# Patient Record
Sex: FEMALE | Race: BLACK OR AFRICAN AMERICAN | NOT HISPANIC OR LATINO
[De-identification: names, ages, dates, MRNs, and addresses within clinical notes are randomized per-mention and may not be internally consistent; named-entity substitution may affect disease eponyms.]

---

## 2018-04-23 PROBLEM — Z00.00 ENCOUNTER FOR PREVENTIVE HEALTH EXAMINATION: Status: ACTIVE | Noted: 2018-04-23

## 2018-05-08 ENCOUNTER — APPOINTMENT (OUTPATIENT)
Dept: PULMONOLOGY | Facility: CLINIC | Age: 68
End: 2018-05-08
Payer: MEDICARE

## 2018-05-08 VITALS
TEMPERATURE: 98.1 F | DIASTOLIC BLOOD PRESSURE: 58 MMHG | HEART RATE: 75 BPM | SYSTOLIC BLOOD PRESSURE: 108 MMHG | BODY MASS INDEX: 23.56 KG/M2 | OXYGEN SATURATION: 97 % | WEIGHT: 120 LBS | HEIGHT: 60 IN

## 2018-05-08 DIAGNOSIS — F17.200 NICOTINE DEPENDENCE, UNSPECIFIED, UNCOMPLICATED: ICD-10-CM

## 2018-05-08 DIAGNOSIS — Z87.09 PERSONAL HISTORY OF OTHER DISEASES OF THE RESPIRATORY SYSTEM: ICD-10-CM

## 2018-05-08 DIAGNOSIS — I25.2 OLD MYOCARDIAL INFARCTION: ICD-10-CM

## 2018-05-08 DIAGNOSIS — Z86.2 PERSONAL HISTORY OF DISEASES OF THE BLOOD AND BLOOD-FORMING ORGANS AND CERTAIN DISORDERS INVOLVING THE IMMUNE MECHANISM: ICD-10-CM

## 2018-05-08 PROCEDURE — 99215 OFFICE O/P EST HI 40 MIN: CPT | Mod: 25

## 2018-05-08 PROCEDURE — 94060 EVALUATION OF WHEEZING: CPT

## 2018-05-08 PROCEDURE — 94727 GAS DIL/WSHOT DETER LNG VOL: CPT

## 2018-05-08 PROCEDURE — 99407 BEHAV CHNG SMOKING > 10 MIN: CPT

## 2018-05-08 PROCEDURE — 94729 DIFFUSING CAPACITY: CPT

## 2018-05-08 RX ORDER — TRAMADOL HYDROCHLORIDE 50 MG/1
50 TABLET, COATED ORAL
Refills: 0 | Status: ACTIVE | COMMUNITY
Start: 2018-05-08

## 2018-05-08 RX ORDER — SIMVASTATIN 20 MG/1
20 TABLET, FILM COATED ORAL
Refills: 0 | Status: ACTIVE | COMMUNITY
Start: 2018-05-08

## 2018-05-08 RX ORDER — ASPIRIN 81 MG/1
81 TABLET, CHEWABLE ORAL
Refills: 0 | Status: ACTIVE | COMMUNITY
Start: 2018-05-08

## 2018-05-08 RX ORDER — TIOTROPIUM BROMIDE 18 UG/1
18 CAPSULE ORAL; RESPIRATORY (INHALATION)
Refills: 0 | Status: ACTIVE | COMMUNITY
Start: 2018-05-08

## 2018-05-08 RX ORDER — DEFERASIROX 90 MG/1
90 TABLET, FILM COATED ORAL
Refills: 0 | Status: ACTIVE | COMMUNITY
Start: 2018-05-08

## 2018-05-08 RX ORDER — LOSARTAN POTASSIUM 50 MG/1
50 TABLET, FILM COATED ORAL
Refills: 0 | Status: ACTIVE | COMMUNITY
Start: 2018-05-08

## 2018-05-08 RX ORDER — LENALIDOMIDE 10 MG/1
10 CAPSULE ORAL
Refills: 0 | Status: ACTIVE | COMMUNITY
Start: 2018-05-08

## 2018-05-08 RX ORDER — FILGRASTIM 300 UG/ML
300 INJECTION, SOLUTION INTRAVENOUS; SUBCUTANEOUS
Refills: 0 | Status: ACTIVE | COMMUNITY
Start: 2018-05-08

## 2018-05-08 RX ORDER — ERYTHROPOIETIN 40000 [IU]/ML
40000 INJECTION, SOLUTION INTRAVENOUS; SUBCUTANEOUS
Refills: 0 | Status: ACTIVE | COMMUNITY
Start: 2018-05-08

## 2018-05-08 RX ORDER — ALBUTEROL SULFATE 90 UG/1
108 (90 BASE) AEROSOL, METERED RESPIRATORY (INHALATION)
Refills: 0 | Status: ACTIVE | COMMUNITY
Start: 2018-05-08

## 2018-05-08 RX ORDER — METOPROLOL SUCCINATE 25 MG/1
25 TABLET, EXTENDED RELEASE ORAL
Refills: 0 | Status: ACTIVE | COMMUNITY
Start: 2018-05-08

## 2018-07-20 ENCOUNTER — APPOINTMENT (OUTPATIENT)
Dept: PULMONOLOGY | Facility: CLINIC | Age: 68
End: 2018-07-20
Payer: MEDICARE

## 2018-07-20 VITALS
SYSTOLIC BLOOD PRESSURE: 108 MMHG | BODY MASS INDEX: 22.46 KG/M2 | OXYGEN SATURATION: 94 % | DIASTOLIC BLOOD PRESSURE: 80 MMHG | HEART RATE: 94 BPM | WEIGHT: 115 LBS

## 2018-07-20 PROCEDURE — 99214 OFFICE O/P EST MOD 30 MIN: CPT

## 2018-10-19 ENCOUNTER — APPOINTMENT (OUTPATIENT)
Dept: PULMONOLOGY | Facility: CLINIC | Age: 68
End: 2018-10-19
Payer: MEDICARE

## 2018-10-19 VITALS
WEIGHT: 116 LBS | OXYGEN SATURATION: 96 % | SYSTOLIC BLOOD PRESSURE: 100 MMHG | BODY MASS INDEX: 22.66 KG/M2 | HEART RATE: 102 BPM | DIASTOLIC BLOOD PRESSURE: 50 MMHG

## 2018-10-19 PROCEDURE — 99214 OFFICE O/P EST MOD 30 MIN: CPT | Mod: 25

## 2018-10-19 PROCEDURE — 99407 BEHAV CHNG SMOKING > 10 MIN: CPT

## 2018-11-27 ENCOUNTER — APPOINTMENT (OUTPATIENT)
Dept: PULMONOLOGY | Facility: CLINIC | Age: 68
End: 2018-11-27
Payer: MEDICARE

## 2018-11-27 VITALS
OXYGEN SATURATION: 88 % | DIASTOLIC BLOOD PRESSURE: 58 MMHG | SYSTOLIC BLOOD PRESSURE: 92 MMHG | BODY MASS INDEX: 22.07 KG/M2 | HEART RATE: 98 BPM | WEIGHT: 113 LBS

## 2018-11-27 PROCEDURE — 99407 BEHAV CHNG SMOKING > 10 MIN: CPT

## 2018-11-27 PROCEDURE — 99214 OFFICE O/P EST MOD 30 MIN: CPT | Mod: 25

## 2019-04-18 ENCOUNTER — APPOINTMENT (OUTPATIENT)
Dept: PULMONOLOGY | Facility: CLINIC | Age: 69
End: 2019-04-18

## 2019-12-19 ENCOUNTER — APPOINTMENT (OUTPATIENT)
Dept: PULMONOLOGY | Facility: CLINIC | Age: 69
End: 2019-12-19
Payer: MEDICARE

## 2019-12-19 VITALS
HEART RATE: 94 BPM | SYSTOLIC BLOOD PRESSURE: 94 MMHG | DIASTOLIC BLOOD PRESSURE: 60 MMHG | HEIGHT: 60 IN | RESPIRATION RATE: 16 BRPM | OXYGEN SATURATION: 88 %

## 2019-12-19 PROCEDURE — 99214 OFFICE O/P EST MOD 30 MIN: CPT

## 2019-12-19 RX ORDER — FLUTICASONE FUROATE, UMECLIDINIUM BROMIDE AND VILANTEROL TRIFENATATE 100; 62.5; 25 UG/1; UG/1; UG/1
100-62.5-25 POWDER RESPIRATORY (INHALATION)
Qty: 1 | Refills: 3 | Status: ACTIVE | COMMUNITY
Start: 2019-12-19 | End: 1900-01-01

## 2019-12-28 NOTE — DISCUSSION/SUMMARY
[FreeTextEntry1] : 70 yo female with OAD at baseline. Smoking cessation stressed. She was given a sample of trelegy and will be prescribed. Her DME company will be contacted because of problem with her concentrator. She is to follow up with her PMD.

## 2019-12-28 NOTE — CONSULT LETTER
[Dear  ___] : Dear  [unfilled], [Courtesy Letter:] : I had the pleasure of seeing your patient, [unfilled], in my office today. [Please see my note below.] : Please see my note below. [Consult Closing:] : Thank you very much for allowing me to participate in the care of this patient.  If you have any questions, please do not hesitate to contact me. [Sincerely,] : Sincerely, [DrLeonela  ___] : Dr. DÍAZ

## 2019-12-28 NOTE — PHYSICAL EXAM
[General Appearance - Well Developed] : well developed [Normal Appearance] : normal appearance [Well Groomed] : well groomed [General Appearance - Well Nourished] : well nourished [No Deformities] : no deformities [General Appearance - In No Acute Distress] : no acute distress [Normal Conjunctiva] : the conjunctiva exhibited no abnormalities [Eyelids - No Xanthelasma] : the eyelids demonstrated no xanthelasmas [Normal Oropharynx] : normal oropharynx [Neck Appearance] : the appearance of the neck was normal [Neck Cervical Mass (___cm)] : no neck mass was observed [Thyroid Diffuse Enlargement] : the thyroid was not enlarged [Thyroid Nodule] : there were no palpable thyroid nodules [Heart Rate And Rhythm] : heart rate and rhythm were normal [Heart Sounds] : normal S1 and S2 [Systolic grade ___/6] : A grade [unfilled]/6 systolic murmur was heard. [Respiration, Rhythm And Depth] : normal respiratory rhythm and effort [Exaggerated Use Of Accessory Muscles For Inspiration] : no accessory muscle use [Decreased Breath Sounds] : decreased breath sounds [Abdomen Soft] : soft [Abdomen Tenderness] : non-tender [Abdomen Mass (___ Cm)] : no abdominal mass palpated [Nail Clubbing] : no clubbing of the fingernails [Cyanosis, Localized] : no localized cyanosis [Petechial Hemorrhages (___cm)] : no petechial hemorrhages [Skin Color & Pigmentation] : normal skin color and pigmentation [] : no rash [No Venous Stasis] : no venous stasis [Skin Lesions] : no skin lesions [No Skin Ulcers] : no skin ulcer [No Xanthoma] : no  xanthoma was observed [No Focal Deficits] : no focal deficits [Oriented To Time, Place, And Person] : oriented to person, place, and time [Impaired Insight] : insight and judgment were intact [Affect] : the affect was normal [FreeTextEntry1] : JVD

## 2019-12-28 NOTE — REVIEW OF SYSTEMS
[Cough] : cough [Sputum] : sputum  [Dyspnea] : dyspnea [Hypertension] : ~T hypertension [Anemia] : anemia [Blood Transfusion] : blood transfusion [Negative] : Sleep Disorder [Hemoptysis] : no hemoptysis [Wheezing] : no wheezing

## 2019-12-28 NOTE — HISTORY OF PRESENT ILLNESS
[FreeTextEntry1] : 70 yo female with hx of OAD presents for follow up. She complains of PRN productive cough without fever, chest pain or hemoptysis.The patient is "OK" on spiriva daily with PRN albuterol MDI. She was hospitalized last month for anemia, treated for mild exacerbation also.

## 2020-01-01 ENCOUNTER — TRANSCRIPTION ENCOUNTER (OUTPATIENT)
Age: 70
End: 2020-01-01

## 2020-01-01 ENCOUNTER — APPOINTMENT (OUTPATIENT)
Dept: PULMONOLOGY | Facility: CLINIC | Age: 70
End: 2020-01-01

## 2020-01-01 ENCOUNTER — APPOINTMENT (OUTPATIENT)
Dept: PULMONOLOGY | Facility: CLINIC | Age: 70
End: 2020-01-01
Payer: MEDICARE

## 2020-01-01 ENCOUNTER — INPATIENT (INPATIENT)
Facility: HOSPITAL | Age: 70
LOS: 0 days | Discharge: HOME | End: 2020-03-16
Attending: HOSPITALIST | Admitting: HOSPITALIST
Payer: MEDICARE

## 2020-01-01 VITALS
SYSTOLIC BLOOD PRESSURE: 138 MMHG | HEART RATE: 84 BPM | DIASTOLIC BLOOD PRESSURE: 70 MMHG | TEMPERATURE: 98.7 F | BODY MASS INDEX: 23.05 KG/M2 | OXYGEN SATURATION: 100 % | WEIGHT: 118 LBS

## 2020-01-01 VITALS
DIASTOLIC BLOOD PRESSURE: 96 MMHG | OXYGEN SATURATION: 100 % | TEMPERATURE: 99 F | RESPIRATION RATE: 22 BRPM | HEART RATE: 84 BPM | SYSTOLIC BLOOD PRESSURE: 151 MMHG

## 2020-01-01 VITALS
SYSTOLIC BLOOD PRESSURE: 126 MMHG | HEART RATE: 88 BPM | TEMPERATURE: 99.1 F | WEIGHT: 118 LBS | BODY MASS INDEX: 23.05 KG/M2 | DIASTOLIC BLOOD PRESSURE: 58 MMHG | OXYGEN SATURATION: 100 %

## 2020-01-01 VITALS — OXYGEN SATURATION: 97 %

## 2020-01-01 DIAGNOSIS — R06.00 DYSPNEA, UNSPECIFIED: ICD-10-CM

## 2020-01-01 DIAGNOSIS — Z02.9 ENCOUNTER FOR ADMINISTRATIVE EXAMINATIONS, UNSPECIFIED: ICD-10-CM

## 2020-01-01 DIAGNOSIS — D46.9 MYELODYSPLASTIC SYNDROME, UNSPECIFIED: ICD-10-CM

## 2020-01-01 DIAGNOSIS — R06.09 OTHER FORMS OF DYSPNEA: ICD-10-CM

## 2020-01-01 DIAGNOSIS — J44.1 CHRONIC OBSTRUCTIVE PULMONARY DISEASE WITH (ACUTE) EXACERBATION: ICD-10-CM

## 2020-01-01 DIAGNOSIS — J44.9 CHRONIC OBSTRUCTIVE PULMONARY DISEASE, UNSPECIFIED: ICD-10-CM

## 2020-01-01 DIAGNOSIS — I10 ESSENTIAL (PRIMARY) HYPERTENSION: ICD-10-CM

## 2020-01-01 DIAGNOSIS — E78.5 HYPERLIPIDEMIA, UNSPECIFIED: ICD-10-CM

## 2020-01-01 DIAGNOSIS — Z72.0 TOBACCO USE: ICD-10-CM

## 2020-01-01 DIAGNOSIS — Z29.9 ENCOUNTER FOR PROPHYLACTIC MEASURES, UNSPECIFIED: ICD-10-CM

## 2020-01-01 LAB
ALBUMIN SERPL ELPH-MCNC: 3.7 G/DL — SIGNIFICANT CHANGE UP (ref 3.3–5)
ALP SERPL-CCNC: 85 U/L — SIGNIFICANT CHANGE UP (ref 40–120)
ALT FLD-CCNC: 25 U/L — SIGNIFICANT CHANGE UP (ref 4–33)
ANION GAP SERPL CALC-SCNC: 12 MMO/L — SIGNIFICANT CHANGE UP (ref 7–14)
ANION GAP SERPL CALC-SCNC: 12 MMO/L — SIGNIFICANT CHANGE UP (ref 7–14)
ANISOCYTOSIS BLD QL: SLIGHT — SIGNIFICANT CHANGE UP
AST SERPL-CCNC: 24 U/L — SIGNIFICANT CHANGE UP (ref 4–32)
B PERT DNA SPEC QL NAA+PROBE: NOT DETECTED — SIGNIFICANT CHANGE UP
BASE EXCESS BLDV CALC-SCNC: 5.7 MMOL/L — SIGNIFICANT CHANGE UP
BASOPHILS # BLD AUTO: 0.03 K/UL — SIGNIFICANT CHANGE UP (ref 0–0.2)
BASOPHILS NFR BLD AUTO: 0.3 % — SIGNIFICANT CHANGE UP (ref 0–2)
BILIRUB SERPL-MCNC: 1 MG/DL — SIGNIFICANT CHANGE UP (ref 0.2–1.2)
BLD GP AB SCN SERPL QL: NEGATIVE — SIGNIFICANT CHANGE UP
BLOOD GAS VENOUS - CREATININE: 0.8 MG/DL — SIGNIFICANT CHANGE UP (ref 0.5–1.3)
BLOOD GAS VENOUS - FIO2: 27 — SIGNIFICANT CHANGE UP
BUN SERPL-MCNC: 11 MG/DL — SIGNIFICANT CHANGE UP (ref 7–23)
BUN SERPL-MCNC: 12 MG/DL — SIGNIFICANT CHANGE UP (ref 7–23)
C PNEUM DNA SPEC QL NAA+PROBE: NOT DETECTED — SIGNIFICANT CHANGE UP
CALCIUM SERPL-MCNC: 9 MG/DL — SIGNIFICANT CHANGE UP (ref 8.4–10.5)
CALCIUM SERPL-MCNC: 9.6 MG/DL — SIGNIFICANT CHANGE UP (ref 8.4–10.5)
CHLORIDE BLDV-SCNC: 100 MMOL/L — SIGNIFICANT CHANGE UP (ref 96–108)
CHLORIDE SERPL-SCNC: 96 MMOL/L — LOW (ref 98–107)
CHLORIDE SERPL-SCNC: 97 MMOL/L — LOW (ref 98–107)
CO2 SERPL-SCNC: 25 MMOL/L — SIGNIFICANT CHANGE UP (ref 22–31)
CO2 SERPL-SCNC: 27 MMOL/L — SIGNIFICANT CHANGE UP (ref 22–31)
CREAT SERPL-MCNC: 0.62 MG/DL — SIGNIFICANT CHANGE UP (ref 0.5–1.3)
CREAT SERPL-MCNC: 0.67 MG/DL — SIGNIFICANT CHANGE UP (ref 0.5–1.3)
EOSINOPHIL # BLD AUTO: 0.09 K/UL — SIGNIFICANT CHANGE UP (ref 0–0.5)
EOSINOPHIL NFR BLD AUTO: 0.9 % — SIGNIFICANT CHANGE UP (ref 0–6)
FLUAV H1 2009 PAND RNA SPEC QL NAA+PROBE: NOT DETECTED — SIGNIFICANT CHANGE UP
FLUAV H1 RNA SPEC QL NAA+PROBE: NOT DETECTED — SIGNIFICANT CHANGE UP
FLUAV H3 RNA SPEC QL NAA+PROBE: NOT DETECTED — SIGNIFICANT CHANGE UP
FLUAV SUBTYP SPEC NAA+PROBE: NOT DETECTED — SIGNIFICANT CHANGE UP
FLUBV RNA SPEC QL NAA+PROBE: NOT DETECTED — SIGNIFICANT CHANGE UP
GAS PNL BLDV: 134 MMOL/L — LOW (ref 136–146)
GIANT PLATELETS BLD QL SMEAR: PRESENT — SIGNIFICANT CHANGE UP
GLUCOSE BLDV-MCNC: 193 MG/DL — HIGH (ref 70–99)
GLUCOSE SERPL-MCNC: 199 MG/DL — HIGH (ref 70–99)
GLUCOSE SERPL-MCNC: 246 MG/DL — HIGH (ref 70–99)
HADV DNA SPEC QL NAA+PROBE: NOT DETECTED — SIGNIFICANT CHANGE UP
HCO3 BLDV-SCNC: 29 MMOL/L — HIGH (ref 20–27)
HCOV PNL SPEC NAA+PROBE: SIGNIFICANT CHANGE UP
HCT VFR BLD CALC: 28.4 % — LOW (ref 34.5–45)
HCT VFR BLD CALC: 29.8 % — LOW (ref 34.5–45)
HCT VFR BLD CALC: 33.5 % — LOW (ref 34.5–45)
HCT VFR BLDV CALC: 32.2 % — LOW (ref 34.5–45)
HCV AB S/CO SERPL IA: 0.21 S/CO — SIGNIFICANT CHANGE UP (ref 0–0.99)
HCV AB SERPL-IMP: SIGNIFICANT CHANGE UP
HGB BLD-MCNC: 10.4 G/DL — LOW (ref 11.5–15.5)
HGB BLD-MCNC: 8.8 G/DL — LOW (ref 11.5–15.5)
HGB BLD-MCNC: 9.3 G/DL — LOW (ref 11.5–15.5)
HGB BLDV-MCNC: 10.4 G/DL — LOW (ref 11.5–15.5)
HMPV RNA SPEC QL NAA+PROBE: NOT DETECTED — SIGNIFICANT CHANGE UP
HPIV1 RNA SPEC QL NAA+PROBE: NOT DETECTED — SIGNIFICANT CHANGE UP
HPIV2 RNA SPEC QL NAA+PROBE: NOT DETECTED — SIGNIFICANT CHANGE UP
HPIV3 RNA SPEC QL NAA+PROBE: NOT DETECTED — SIGNIFICANT CHANGE UP
HPIV4 RNA SPEC QL NAA+PROBE: NOT DETECTED — SIGNIFICANT CHANGE UP
IMM GRANULOCYTES NFR BLD AUTO: 0.6 % — SIGNIFICANT CHANGE UP (ref 0–1.5)
LACTATE BLDV-MCNC: 1.8 MMOL/L — SIGNIFICANT CHANGE UP (ref 0.5–2)
LG PLATELETS BLD QL AUTO: SLIGHT — SIGNIFICANT CHANGE UP
LYMPHOCYTES # BLD AUTO: 1.59 K/UL — SIGNIFICANT CHANGE UP (ref 1–3.3)
LYMPHOCYTES # BLD AUTO: 15.1 % — SIGNIFICANT CHANGE UP (ref 13–44)
MACROCYTES BLD QL: SLIGHT — SIGNIFICANT CHANGE UP
MAGNESIUM SERPL-MCNC: 1.7 MG/DL — SIGNIFICANT CHANGE UP (ref 1.6–2.6)
MANUAL SMEAR VERIFICATION: SIGNIFICANT CHANGE UP
MCHC RBC-ENTMCNC: 29.6 PG — SIGNIFICANT CHANGE UP (ref 27–34)
MCHC RBC-ENTMCNC: 29.8 PG — SIGNIFICANT CHANGE UP (ref 27–34)
MCHC RBC-ENTMCNC: 29.9 PG — SIGNIFICANT CHANGE UP (ref 27–34)
MCHC RBC-ENTMCNC: 31 % — LOW (ref 32–36)
MCHC RBC-ENTMCNC: 31 % — LOW (ref 32–36)
MCHC RBC-ENTMCNC: 31.2 % — LOW (ref 32–36)
MCV RBC AUTO: 95.5 FL — SIGNIFICANT CHANGE UP (ref 80–100)
MCV RBC AUTO: 95.6 FL — SIGNIFICANT CHANGE UP (ref 80–100)
MCV RBC AUTO: 96.3 FL — SIGNIFICANT CHANGE UP (ref 80–100)
MONOCYTES # BLD AUTO: 0.7 K/UL — SIGNIFICANT CHANGE UP (ref 0–0.9)
MONOCYTES NFR BLD AUTO: 6.6 % — SIGNIFICANT CHANGE UP (ref 2–14)
NEUTROPHILS # BLD AUTO: 8.06 K/UL — HIGH (ref 1.8–7.4)
NEUTROPHILS NFR BLD AUTO: 76.5 % — SIGNIFICANT CHANGE UP (ref 43–77)
NRBC # FLD: 0 K/UL — SIGNIFICANT CHANGE UP (ref 0–0)
NRBC # FLD: 0.02 K/UL — SIGNIFICANT CHANGE UP (ref 0–0)
NRBC # FLD: 0.03 K/UL — SIGNIFICANT CHANGE UP (ref 0–0)
NT-PROBNP SERPL-SCNC: 137.2 PG/ML — SIGNIFICANT CHANGE UP
PCO2 BLDV: 58 MMHG — HIGH (ref 41–51)
PH BLDV: 7.35 PH — SIGNIFICANT CHANGE UP (ref 7.32–7.43)
PHOSPHATE SERPL-MCNC: 3.7 MG/DL — SIGNIFICANT CHANGE UP (ref 2.5–4.5)
PLATELET # BLD AUTO: 62 K/UL — LOW (ref 150–400)
PLATELET # BLD AUTO: 81 K/UL — LOW (ref 150–400)
PLATELET # BLD AUTO: 81 K/UL — LOW (ref 150–400)
PLATELET CLUMP BLD QL SMEAR: SIGNIFICANT CHANGE UP
PLATELET COUNT - ESTIMATE: SIGNIFICANT CHANGE UP
PMV BLD: SIGNIFICANT CHANGE UP FL (ref 7–13)
PO2 BLDV: 67 MMHG — HIGH (ref 35–40)
POTASSIUM BLDV-SCNC: 4.6 MMOL/L — HIGH (ref 3.4–4.5)
POTASSIUM SERPL-MCNC: 4.2 MMOL/L — SIGNIFICANT CHANGE UP (ref 3.5–5.3)
POTASSIUM SERPL-MCNC: 5.1 MMOL/L — SIGNIFICANT CHANGE UP (ref 3.5–5.3)
POTASSIUM SERPL-SCNC: 4.2 MMOL/L — SIGNIFICANT CHANGE UP (ref 3.5–5.3)
POTASSIUM SERPL-SCNC: 5.1 MMOL/L — SIGNIFICANT CHANGE UP (ref 3.5–5.3)
PROT SERPL-MCNC: 6.4 G/DL — SIGNIFICANT CHANGE UP (ref 6–8.3)
RBC # BLD: 2.97 M/UL — LOW (ref 3.8–5.2)
RBC # BLD: 3.12 M/UL — LOW (ref 3.8–5.2)
RBC # BLD: 3.48 M/UL — LOW (ref 3.8–5.2)
RBC # FLD: 20.6 % — HIGH (ref 10.3–14.5)
RBC # FLD: 21.1 % — HIGH (ref 10.3–14.5)
RBC # FLD: 21.2 % — HIGH (ref 10.3–14.5)
RH IG SCN BLD-IMP: POSITIVE — SIGNIFICANT CHANGE UP
RSV RNA SPEC QL NAA+PROBE: NOT DETECTED — SIGNIFICANT CHANGE UP
RV+EV RNA SPEC QL NAA+PROBE: NOT DETECTED — SIGNIFICANT CHANGE UP
SAO2 % BLDV: 91.4 % — HIGH (ref 60–85)
SODIUM SERPL-SCNC: 134 MMOL/L — LOW (ref 135–145)
SODIUM SERPL-SCNC: 135 MMOL/L — SIGNIFICANT CHANGE UP (ref 135–145)
TROPONIN T, HIGH SENSITIVITY: 13 NG/L — SIGNIFICANT CHANGE UP (ref ?–14)
TROPONIN T, HIGH SENSITIVITY: 25 NG/L — SIGNIFICANT CHANGE UP (ref ?–14)
WBC # BLD: 10.53 K/UL — HIGH (ref 3.8–10.5)
WBC # BLD: 6.41 K/UL — SIGNIFICANT CHANGE UP (ref 3.8–10.5)
WBC # BLD: 6.66 K/UL — SIGNIFICANT CHANGE UP (ref 3.8–10.5)
WBC # FLD AUTO: 10.53 K/UL — HIGH (ref 3.8–10.5)
WBC # FLD AUTO: 6.41 K/UL — SIGNIFICANT CHANGE UP (ref 3.8–10.5)
WBC # FLD AUTO: 6.66 K/UL — SIGNIFICANT CHANGE UP (ref 3.8–10.5)

## 2020-01-01 PROCEDURE — 99239 HOSP IP/OBS DSCHRG MGMT >30: CPT | Mod: GC

## 2020-01-01 PROCEDURE — 71046 X-RAY EXAM CHEST 2 VIEWS: CPT | Mod: 26

## 2020-01-01 PROCEDURE — 99214 OFFICE O/P EST MOD 30 MIN: CPT

## 2020-01-01 PROCEDURE — 99223 1ST HOSP IP/OBS HIGH 75: CPT | Mod: GC

## 2020-01-01 RX ORDER — ALBUTEROL SULFATE 90 UG/1
108 (90 BASE) INHALANT RESPIRATORY (INHALATION)
Qty: 3 | Refills: 3 | Status: ACTIVE | COMMUNITY
Start: 2018-05-08 | End: 1900-01-01

## 2020-01-01 RX ORDER — IPRATROPIUM/ALBUTEROL SULFATE 18-103MCG
3 AEROSOL WITH ADAPTER (GRAM) INHALATION ONCE
Refills: 0 | Status: COMPLETED | OUTPATIENT
Start: 2020-01-01 | End: 2020-01-01

## 2020-01-01 RX ORDER — SODIUM CHLORIDE 9 MG/ML
1000 INJECTION INTRAMUSCULAR; INTRAVENOUS; SUBCUTANEOUS ONCE
Refills: 0 | Status: COMPLETED | OUTPATIENT
Start: 2020-01-01 | End: 2020-01-01

## 2020-01-01 RX ORDER — FLUTICASONE FUROATE, UMECLIDINIUM BROMIDE AND VILANTEROL TRIFENATATE 100; 62.5; 25 UG/1; UG/1; UG/1
100-62.5-25 POWDER RESPIRATORY (INHALATION)
Qty: 1 | Refills: 3 | Status: ACTIVE | COMMUNITY
Start: 2020-01-01 | End: 1900-01-01

## 2020-01-01 RX ORDER — MORPHINE SULFATE 50 MG/1
4 CAPSULE, EXTENDED RELEASE ORAL ONCE
Refills: 0 | Status: DISCONTINUED | OUTPATIENT
Start: 2020-01-01 | End: 2020-01-01

## 2020-01-01 RX ORDER — ALBUTEROL SULFATE 2.5 MG/3ML
(2.5 MG/3ML) SOLUTION RESPIRATORY (INHALATION)
Qty: 180 | Refills: 3 | Status: ACTIVE | COMMUNITY
Start: 2018-05-08

## 2020-01-01 RX ORDER — ALPRAZOLAM 0.25 MG
0.5 TABLET ORAL ONCE
Refills: 0 | Status: DISCONTINUED | OUTPATIENT
Start: 2020-01-01 | End: 2020-01-01

## 2020-01-01 RX ORDER — ACETAMINOPHEN 500 MG
975 TABLET ORAL ONCE
Refills: 0 | Status: COMPLETED | OUTPATIENT
Start: 2020-01-01 | End: 2020-01-01

## 2020-01-01 RX ORDER — AZITHROMYCIN 500 MG/1
250 TABLET, FILM COATED ORAL DAILY
Refills: 0 | Status: DISCONTINUED | OUTPATIENT
Start: 2020-01-01 | End: 2020-01-01

## 2020-01-01 RX ORDER — AZITHROMYCIN 500 MG/1
1 TABLET, FILM COATED ORAL
Qty: 3 | Refills: 0
Start: 2020-01-01 | End: 2020-01-01

## 2020-01-01 RX ORDER — ATORVASTATIN CALCIUM 80 MG/1
1 TABLET, FILM COATED ORAL
Qty: 0 | Refills: 0 | DISCHARGE

## 2020-01-01 RX ORDER — BENZOCAINE AND MENTHOL 5; 1 G/100ML; G/100ML
1 LIQUID ORAL ONCE
Refills: 0 | Status: DISCONTINUED | OUTPATIENT
Start: 2020-01-01 | End: 2020-01-01

## 2020-01-01 RX ORDER — LIDOCAINE 4 G/100G
1 CREAM TOPICAL ONCE
Refills: 0 | Status: COMPLETED | OUTPATIENT
Start: 2020-01-01 | End: 2020-01-01

## 2020-01-01 RX ORDER — ZOLPIDEM TARTRATE 10 MG/1
5 TABLET ORAL AT BEDTIME
Refills: 0 | Status: DISCONTINUED | OUTPATIENT
Start: 2020-01-01 | End: 2020-01-01

## 2020-01-01 RX ORDER — AMLODIPINE BESYLATE 2.5 MG/1
1 TABLET ORAL
Qty: 0 | Refills: 0 | DISCHARGE

## 2020-01-01 RX ORDER — ATORVASTATIN CALCIUM 80 MG/1
20 TABLET, FILM COATED ORAL AT BEDTIME
Refills: 0 | Status: DISCONTINUED | OUTPATIENT
Start: 2020-01-01 | End: 2020-01-01

## 2020-01-01 RX ORDER — IPRATROPIUM/ALBUTEROL SULFATE 18-103MCG
3 AEROSOL WITH ADAPTER (GRAM) INHALATION EVERY 6 HOURS
Refills: 0 | Status: DISCONTINUED | OUTPATIENT
Start: 2020-01-01 | End: 2020-01-01

## 2020-01-01 RX ORDER — AMLODIPINE BESYLATE 2.5 MG/1
10 TABLET ORAL DAILY
Refills: 0 | Status: DISCONTINUED | OUTPATIENT
Start: 2020-01-01 | End: 2020-01-01

## 2020-01-01 RX ORDER — MORPHINE SULFATE 50 MG/1
2 CAPSULE, EXTENDED RELEASE ORAL ONCE
Refills: 0 | Status: DISCONTINUED | OUTPATIENT
Start: 2020-01-01 | End: 2020-01-01

## 2020-01-01 RX ORDER — OXYCODONE AND ACETAMINOPHEN 5; 325 MG/1; MG/1
1 TABLET ORAL EVERY 8 HOURS
Refills: 0 | Status: DISCONTINUED | OUTPATIENT
Start: 2020-01-01 | End: 2020-01-01

## 2020-01-01 RX ORDER — ZOLPIDEM TARTRATE 10 MG/1
1 TABLET ORAL
Qty: 0 | Refills: 0 | DISCHARGE

## 2020-01-01 RX ORDER — TIOTROPIUM BROMIDE 18 UG/1
1 CAPSULE ORAL; RESPIRATORY (INHALATION)
Qty: 0 | Refills: 0 | DISCHARGE

## 2020-01-01 RX ORDER — AZITHROMYCIN 500 MG/1
500 TABLET, FILM COATED ORAL ONCE
Refills: 0 | Status: COMPLETED | OUTPATIENT
Start: 2020-01-01 | End: 2020-01-01

## 2020-01-01 RX ORDER — FLUTICASONE PROPIONATE AND SALMETEROL 50; 250 UG/1; UG/1
250-50 POWDER RESPIRATORY (INHALATION)
Qty: 3 | Refills: 3 | Status: DISCONTINUED | COMMUNITY
Start: 2018-05-08 | End: 2020-01-01

## 2020-01-01 RX ADMIN — AZITHROMYCIN 250 MILLIGRAM(S): 500 TABLET, FILM COATED ORAL at 12:07

## 2020-01-01 RX ADMIN — Medication 3 MILLILITER(S): at 06:56

## 2020-01-01 RX ADMIN — Medication 50 MILLIGRAM(S): at 05:59

## 2020-01-01 RX ADMIN — Medication 3 MILLILITER(S): at 17:11

## 2020-01-01 RX ADMIN — MORPHINE SULFATE 4 MILLIGRAM(S): 50 CAPSULE, EXTENDED RELEASE ORAL at 09:37

## 2020-01-01 RX ADMIN — Medication 975 MILLIGRAM(S): at 09:06

## 2020-01-01 RX ADMIN — LIDOCAINE 1 PATCH: 4 CREAM TOPICAL at 09:06

## 2020-01-01 RX ADMIN — Medication 3 MILLILITER(S): at 22:50

## 2020-01-01 RX ADMIN — Medication 50 MILLIGRAM(S): at 06:56

## 2020-01-01 RX ADMIN — AZITHROMYCIN 500 MILLIGRAM(S): 500 TABLET, FILM COATED ORAL at 19:33

## 2020-01-01 RX ADMIN — MORPHINE SULFATE 4 MILLIGRAM(S): 50 CAPSULE, EXTENDED RELEASE ORAL at 13:10

## 2020-01-01 RX ADMIN — SODIUM CHLORIDE 1000 MILLILITER(S): 9 INJECTION INTRAMUSCULAR; INTRAVENOUS; SUBCUTANEOUS at 10:59

## 2020-01-01 RX ADMIN — OXYCODONE AND ACETAMINOPHEN 1 TABLET(S): 5; 325 TABLET ORAL at 10:39

## 2020-01-01 RX ADMIN — MORPHINE SULFATE 4 MILLIGRAM(S): 50 CAPSULE, EXTENDED RELEASE ORAL at 10:15

## 2020-01-01 RX ADMIN — Medication 3 MILLILITER(S): at 07:22

## 2020-01-01 RX ADMIN — LIDOCAINE 1 PATCH: 4 CREAM TOPICAL at 19:33

## 2020-01-01 RX ADMIN — LIDOCAINE 1 PATCH: 4 CREAM TOPICAL at 21:00

## 2020-01-01 RX ADMIN — OXYCODONE AND ACETAMINOPHEN 1 TABLET(S): 5; 325 TABLET ORAL at 19:00

## 2020-01-01 RX ADMIN — MORPHINE SULFATE 2 MILLIGRAM(S): 50 CAPSULE, EXTENDED RELEASE ORAL at 21:41

## 2020-01-01 RX ADMIN — MORPHINE SULFATE 4 MILLIGRAM(S): 50 CAPSULE, EXTENDED RELEASE ORAL at 13:30

## 2020-01-01 RX ADMIN — Medication 0.5 MILLIGRAM(S): at 06:31

## 2020-01-01 RX ADMIN — OXYCODONE AND ACETAMINOPHEN 1 TABLET(S): 5; 325 TABLET ORAL at 11:05

## 2020-01-01 RX ADMIN — Medication 3 MILLILITER(S): at 16:01

## 2020-01-01 RX ADMIN — ATORVASTATIN CALCIUM 20 MILLIGRAM(S): 80 TABLET, FILM COATED ORAL at 21:41

## 2020-01-01 RX ADMIN — AMLODIPINE BESYLATE 10 MILLIGRAM(S): 2.5 TABLET ORAL at 05:59

## 2020-01-01 RX ADMIN — Medication 3 MILLILITER(S): at 04:36

## 2020-01-01 RX ADMIN — MORPHINE SULFATE 2 MILLIGRAM(S): 50 CAPSULE, EXTENDED RELEASE ORAL at 22:00

## 2020-01-01 RX ADMIN — Medication 3 MILLILITER(S): at 07:04

## 2020-01-01 RX ADMIN — OXYCODONE AND ACETAMINOPHEN 1 TABLET(S): 5; 325 TABLET ORAL at 17:54

## 2020-01-01 RX ADMIN — Medication 975 MILLIGRAM(S): at 10:06

## 2020-01-01 RX ADMIN — Medication 3 MILLILITER(S): at 11:32

## 2020-03-15 NOTE — H&P ADULT - NSHPSOCIALHISTORY_GEN_ALL_CORE
Patient lives at home with , retired. She denies any smoking history, alcohol use, or illicit drug use.

## 2020-03-15 NOTE — DISCHARGE NOTE PROVIDER - NSDCFUADDAPPT_GEN_ALL_CORE_FT
1. After being discharged, please have close follow up with your PMD, Dr. Melgoza  2. Please follow up with your hematologist, Dr. Rowland, upon discharge.

## 2020-03-15 NOTE — ED PROVIDER NOTE - NS ED ROS FT
GENERAL: No fever or chills, //             EYES: no change in vision, //             HEENT: no trouble swallowing or speaking, //             CARDIAC: no chest pain, //              PULMONARY: SOB, //             GI: no abdominal pain, no nausea or no vomiting, no diarrhea or constipation, //             : No changes in urination,  //            SKIN: no rashes,  //            NEURO: no headache,  //             MSK: No joint pain otherwise as HPI or negative. ~Alvarez Gilliam DO PGY2

## 2020-03-15 NOTE — DISCHARGE NOTE PROVIDER - NSDCADMDATE_GEN_ALL_CORE_FT
15-Mar-2020 10:29 Pt is going to wait for IR but states "this is unacceptable " IR was contacted to confirm correct order  They stated that they will see the patient as soon as possible       Zeeshan Cason RN  12/26/19 [id number]

## 2020-03-15 NOTE — ED PROVIDER NOTE - PHYSICAL EXAMINATION
General: nad, mild tachypnea, speaking full sentences  HEENT: EOMI, PERRLA, normal mucosa, normal oropharynx, no lesions on the lips or on oral mucosa, normal external ear  Neck: supple, no lymphadenopathy, full range of motion, no nuchal rigidity  CV: RRR, normal S1 and S2 with no murmur, capillary refill less than two seconds  Resp: diminished lung sounds in bases b/l   Abd: non-distended, soft, non-tender  : no CVA tenderness  MSK: full range of motion, no cyanosis, no edema, no clubbing, no immobility  Neuro: CN II-XII grossly intact, muscle strength 5/5 in all extremities, normal gait  Skin: no rashes, skin intact

## 2020-03-15 NOTE — DISCHARGE NOTE PROVIDER - HOSPITAL COURSE
71 yo female with PMH HTN, HLD, COPD req 2L NC at home, myelodysplastic syndrome with recent admission to OSH for pneumonia admitted because of worsening dyspnea and generalized body aches. Patient admitted previously with similar symptoms multiple times at OSH and found to be anemic requiring blood transfusion, thought 2/2 MDS, however CBC on admission > 10. CBC was rechecked and remained stable, but patient remained with dyspnea on walking to room door, thought 2/2 COPD exacerbation. Patient was treated with nebulizers, short course prednisone, and azithromycin. 71 yo female with PMH HTN, HLD, COPD req 2L NC at home, myelodysplastic syndrome with recent admission to OSH for pneumonia admitted because of worsening dyspnea and generalized body aches. Patient admitted previously with similar symptoms multiple times at OSH and found to be anemic requiring blood transfusion, thought 2/2 MDS, however CBC on admission > 10. CBC was rechecked and remained stable, but patient remained with dyspnea on walking to room door, thought 2/2 COPD exacerbation. Patient was treated with nebulizers, short course prednisone, and azithromycin.         Patient is currently ready for discharge. She has been afebrile and her VS have been stable. Her oxygen requirements have been decreased from 4L O2 on admission to 2L O2. Patient is no longer short of breath and will be discharged with course of prednisone to be completed for 4-day course. She was instructed to have close follow up with her PMD and hematologist. 69 yo female with PMH HTN, HLD, COPD req 2L NC at home, myelodysplastic syndrome with recent admission to OSH for pneumonia admitted because of worsening dyspnea and generalized body aches. Patient admitted previously with similar symptoms multiple times at OSH and found to be anemic requiring blood transfusion, thought 2/2 MDS, however CBC on admission > 10. CBC was rechecked and remained stable, but patient remained with dyspnea on walking to room door, thought 2/2 COPD exacerbation. Patient was treated with nebulizers, short course prednisone, and azithromycin.         Patient is currently ready for discharge. She has been afebrile and her VS have been stable. Her oxygen requirements have been decreased from 4L O2 on admission to 2L O2. Patient is no longer short of breath and will be discharged with course of prednisone and azithromycin to be completed for 4-day course. She was instructed to have close follow up with her PMD and hematologist.

## 2020-03-15 NOTE — DISCHARGE NOTE PROVIDER - PROVIDER TOKENS
PROVIDER:[TOKEN:[[id number]],FOLLOWUP:[2 weeks]],FREE:[LAST:[Abad],FIRST:[Roxi],PHONE:[phone]],FAX:[(   )    -],FOLLOWUP:[1 week]]

## 2020-03-15 NOTE — ED ADULT TRIAGE NOTE - CHIEF COMPLAINT QUOTE
Pt arrives for SOB x3days, denies fever chills cough. Pt states this is what happens when she needs a blood transfusion. Pt appears to have increased work of breathing. Pt on 3L o2 baseline.  at bedside. Hx MBS cancer, COPD, chronic back pain

## 2020-03-15 NOTE — ED ADULT NURSE NOTE - NSIMPLEMENTINTERV_GEN_ALL_ED
Implemented All Fall Risk Interventions:  [place] to call system. Call bell, personal items and telephone within reach. Instruct patient to call for assistance. Room bathroom lighting operational. Non-slip footwear when patient is off stretcher. Physically safe environment: no spills, clutter or unnecessary equipment. Stretcher in lowest position, wheels locked, appropriate side rails in place. Provide visual cue, wrist band, yellow gown, etc. Monitor gait and stability. Monitor for mental status changes and reorient to person, place, and time. Review medications for side effects contributing to fall risk. Reinforce activity limits and safety measures with patient and family.

## 2020-03-15 NOTE — H&P ADULT - PROBLEM SELECTOR PLAN 2
Pt with history of MDS with past admissions for symptomatic anemia. Back aches, generalized body aches likely 2/2 MDS, but no evidence for anemia on lab tests  - Hgb 10.4 on admission; will recheck CBC as patient stating her hemoglobin is generally 8-9.  - given morphine for pain control in ED  - can try tylenol for body aches, escalate pain medicine if necessary; pt reports use of tramadol 50mg BID at home  - will need to f/u with Dr. Rowland regarding plan for long term treatment of patient's MDS

## 2020-03-15 NOTE — H&P ADULT - ASSESSMENT
69 yo female with PMH of HTN, HLD, COPD requiring 2L NC O2 at home, MDS, and recent admission to [facility] hospital for pneumonia, admitted with new onset dyspnea on exertion concerning for COPD exacerbation.

## 2020-03-15 NOTE — ED PROVIDER NOTE - OBJECTIVE STATEMENT
71 yo f pmh HTN, HLD, COPD on 2L NC, MDS (last chemo months ago), pw dyspnea on exertion. [facility] pt. was recently admitted to [facility], where pt obtains most of her care, for copd exacerbation from 2/2420-3/4/20 for COPD exacerbation. pt reports that when she becomes this dyspnic her HG is low and she requires transfusion. reports current episode of sob began approx one week prior and has been getting progressively worse prompting eval today. denies cp, cough, sick contacts, n/v, abd pain. no dark/tarry stools. no BRBPR. 69 yo f pmh HTN, HLD, COPD on 2L NC, MDS (last chemo months ago), pw dyspnea on exertion. [facility] pt. was recently admitted to [facility], where pt obtains most of her care, for copd exacerbation from 2/2420-3/4/20 for COPD exacerbation. pt reports that when she becomes this dyspnic her HG is low and she requires transfusion. reports current episode of sob began approx one week prior and has been getting progressively worse prompting eval today. denies cp, cough, sick contacts, n/v, abd pain. no dark/tarry stools. no BRBPR.  PMD: Dr. Melgoza  H/O: Dr. Rowland.

## 2020-03-15 NOTE — ED PROVIDER NOTE - PROGRESS NOTE DETAILS
Donna Pineda M.D. Resident  Pt still in severe back pain, morphine ordered. CBC results discussed with patient. Patient understands she does not need a transfusion at this time.  Shared decision making with pt and  at bedside, agreeable to staying for dyspnea. Hospitalist paged. Donna Pineda M.D. Resident  Pt reassessed, pain better, Hospitalist paged again. Donna Pineda M.D. Resident  Pt reassessed, now with leg cramps. States she gets cramps when dehydrated or anemic. Pt not anemic. IVF ordered. Hospitalist paged again. Donna Pineda M.D. Resident  Pt states her CBC results is not accurate (her highest HH was 9.5). RPT CBC ordered.

## 2020-03-15 NOTE — ED PROVIDER NOTE - ATTENDING CONTRIBUTION TO CARE
MD Wallace:  I performed a face to face bedside interview with patient regarding history of present illness, review of symptoms and past medical history. I completed an independent physical exam(documented below).  I have discussed patient's plan of care with resident.   I agree with note as stated above, having amended the EMR as needed to reflect my findings. I have discussed the assessment and plan of care.  This includes during the time I functioned as the attending physician for this patient.  PE:  Gen: Alert, mild resp distress  Head: NC, AT,  EOMI, normal lids/conjunctiva  ENT:  normal hearing, patent oropharynx without erythema/exudate  Neck: +supple, no tenderness/meningismus/JVD, +Trachea midline  Chest: no chest wall tenderness, equal chest rise  Pulm: Bilateral BS, tachypneic, diminished lung sounds  CV: RRR, no M/R/G, +dist pulses  Abd: +BS, soft, NT/ND  Rectal: deferred  Mskel: no edema/erythema/cyanosis  Skin: no rash  Neuro: AAOx3  MDM:  69yo F w/ pmh of htn, hld, MDS (last chemo months ago), anemia requiring blood transfusion in the past, copd on home O2 (2L via NC), s/p recent hospitalization at [facility] for copd exacerbation, p/w dyspnea on exertion. Denies dark or bloody stools, hematuria or vaginal bleeding. Not on AC. ECG, labs, imaging, meds, TBA.

## 2020-03-15 NOTE — H&P ADULT - ATTENDING COMMENTS
Patient seen and examined. Agree with above note by resident.    71 y/o F with MDS, recent admission at outside hospital for PNA now presents with increased JUSTICE.     #JUSTICE - patient denies any fevers or cough. States that she feels out of breath lula during exertion and feels she needs PRBC transfusion. Patient states that she was treated for PNA 2 weeks ago. Since then fevers and cough have resolved however JUSTICE has not improved. Currently no fevers, no leukocytosis and clear CXR. Low suspicion for recurrent PNA. Admitted for COPD exacerbation - on exam has decreased air movement however no wheezing appreciated. Treat for mild COPD exacerbation with duonebs, steroids and azithro. No signs of HF on exam. Does not appear overloaded. Hgb 9.3, pt reports her counts are usually lower hence doubt symptoms are due to anemia. Check RVP for superimposed viral infection. If clinically not improved, can consider further imaging with CT.     #MDS - requires multiple transfusions in past. Recently transfused 2 weeks ago. Counts stable for now. Inform patient's hematologist Dr. Rowland tomorrow about patient's admission. No indication for transfusion at this time. Chronic pain. Istop performed. Can restart patient's home regimen.     Plan discussed with patient and HS

## 2020-03-15 NOTE — H&P ADULT - NSICDXPASTMEDICALHX_GEN_ALL_CORE_FT
PAST MEDICAL HISTORY:  Arthritis legs and back    Bronchitis     Gastrointestinal hemorrhage     Hemorrhoids     Hyperlipidemia     Hypertension     Smoker     Systolic murmur II/VI since she was 10 years old and was told by pediatrician not to do any swimming activity no work up was done however pt worked as a  in hotel doing  housework and pushing cart for 20 years and asymptomatic

## 2020-03-15 NOTE — DISCHARGE NOTE PROVIDER - NSDCMRMEDTOKEN_GEN_ALL_CORE_FT
Ambien 10 mg oral tablet: 1 tab(s) orally once a day (at bedtime)  amLODIPine 10 mg oral tablet: 1 tab(s) orally once a day  atorvastatin 20 mg oral tablet: 1 tab(s) orally once a day  biotin:  orally 1 tab daily  ferrous sulfate 324 mg (65 mg elemental iron) oral tablet:  orally 3x/day  Percocet 5/325 oral tablet: 1 tab(s) orally every 6 hours, As Needed for severe pain  ProAir HFA 90 mcg/inh inhalation aerosol: 2 puff(s) inhaled 4 times a day, As Needed  tiotropium 18 mcg inhalation capsule: 1 cap(s) inhaled once a day Ambien 10 mg oral tablet: 1 tab(s) orally once a day (at bedtime)  amLODIPine 10 mg oral tablet: 1 tab(s) orally once a day  atorvastatin 20 mg oral tablet: 1 tab(s) orally once a day  azithromycin 250 mg oral tablet: 1 tab(s) orally once a day  biotin:  orally 1 tab daily  ferrous sulfate 324 mg (65 mg elemental iron) oral tablet:  orally 3x/day  Percocet 5/325 oral tablet: 1 tab(s) orally every 6 hours, As Needed for severe pain  predniSONE 50 mg oral tablet: 1 tab(s) orally once a day  ProAir HFA 90 mcg/inh inhalation aerosol: 2 puff(s) inhaled 4 times a day, As Needed  tiotropium 18 mcg inhalation capsule: 1 cap(s) inhaled once a day

## 2020-03-15 NOTE — CHART NOTE - NSCHARTNOTEFT_GEN_A_CORE
Patient Name: Vania EpsteinBirth Date: 1950  Address: [address] [place], [place] [address] [place], [place] [address]Sji: Female  Rx Written	Rx Dispensed	Drug	Quantity	Days Supply	Prescriber Name	Payment Method	Dispenser  2019/11/25	2020/03/07	zolpidem tartrate 10 mg tablet	30	30	Fillos, Triantafillos J	Insurance	[facility] #[id number]  2020/03/06	2020/03/07	oxycodone-acetaminophen 5-325 mg tab	90	30	Fillos, Triantafillos J	Insurance	[facility] #[id number]  2019/11/25	2020/02/04	zolpidem tartrate 10 mg tablet	30	30	Fillos, Triantafillos J	Insurance	[facility] #[id number]  2020/02/03	2020/02/04	oxycodone-acetaminophen 5-325 mg tab	90	30	Fillos, Triantafillos J	Insurance	[facility] #[id number]  2020/02/03	2020/02/04	alprazolam 0.5 mg tablet	90	30	Fillos, Triantafillos J	Insurance	[facility] #[id number]  2019/11/25	2020/01/04	zolpidem tartrate 10 mg tablet	30	30	Fillos, Triantafillos J	Insurance	[facility] #[id number]  2020/01/03	2020/01/04	alprazolam 0.5 mg tablet	90	30	Fillos, Triantafillos J	Insurance	[facility] #[id number]  2020/01/03	2020/01/04	oxycodone-acetaminophen 5-325 mg tab	90	30	Fillos, Triantafillos J	Insurance	[facility] #[id number]  2019/11/25	2019/11/29	alprazolam 0.5 mg tablet	90	30	Fillos, Triantafillos J	Insurance	[facility] #[id number]  2019/11/25	2019/11/29	zolpidem tartrate 10 mg tablet	30	30	Fillos, Triantafillos J	Insurance	[facility] #[id number]  2019/11/25	2019/11/29	oxycodone-acetaminophen 5-325 mg tab	90	30	Fillos, Triantafillos J	Insurance	[facility] #[id number]  2019/10/08	2019/10/18	zolpidem tartrate 10 mg tablet	30	30	Fillos, Triantafillos J	Medicare	[facility] #[id number]  2019/10/14	2019/10/18	tramadol hcl 50 mg tablet	60	15	Fillos, Triantafillos J	Medicare	[facility] #[id number]  2019/10/08	2019/10/11	alprazolam 0.5 mg tablet	90	30	Fillos, Triantafillos J	Medicare	[facility] #[id number]  2019/03/26	2019/09/16	tramadol hcl 50 mg tablet	60	15	Fillos, Triantafillos J	Medicare	[facility] #[id number]  2019/09/06	2019/09/16	zolpidem tartrate 10 mg tablet	30	30	Fillos, Triantafillos J	Medicare	[facility] #[id number]  2019/09/06	2019/09/10	alprazolam 0.5 mg tablet	90	30	Fillos, Triantafillos J	Medicare	[facility] #[id number]  2019/09/09	2019/09/10	oxycodone-acetaminophen 5-325 mg tab	90	30	Fillos, Triantafillos J	Medicare	[facility] #[id number]  2019/05/29	2019/08/13	tramadol hcl 50 mg tablet	60	15	Fillos, Triantafillos J	Medicare	[facility] #[id number]  2019/08/12	2019/08/13	zolpidem tartrate 10 mg tablet	30	30	Fillos, Triantafillos J	Medicare	[facility] #[id number]  2019/07/02	2019/07/08	oxycodone-acetaminophen 5-325 mg tab	90	30	Fillos, Triantafillos J	Medicare	[facility] #[id number] This report was requested by: Stacey Lake | Reference #: [id number]    Patient Name: Vania EpsteinBirth Date: 1950  Address: [address] [place] [address] [address] [place], [place] [address]Qra: Female  Rx Written	Rx Dispensed	Drug	Quantity	Days Supply	Prescriber Name	Payment Method	Dispenser  2019/11/25	2020/03/07	zolpidem tartrate 10 mg tablet	30	30	Fillos, Triantafillos J	Insurance	[facility] #[id number]  2020/03/06	2020/03/07	oxycodone-acetaminophen 5-325 mg tab	90	30	Fillos, Triantafillos J	Insurance	[facility] #[id number]  2019/11/25	2020/02/04	zolpidem tartrate 10 mg tablet	30	30	Fillos, Triantafillos J	Insurance	[facility] #[id number]  2020/02/03	2020/02/04	oxycodone-acetaminophen 5-325 mg tab	90	30	Fillos, Triantafillos J	Insurance	[facility] #[id number]  2020/02/03	2020/02/04	alprazolam 0.5 mg tablet	90	30	Fillos, Triantafillos J	Insurance	[facility] #[id number]  2019/11/25	2020/01/04	zolpidem tartrate 10 mg tablet	30	30	Fillos, Triantafillos J	Insurance	[facility] #[id number]  2020/01/03	2020/01/04	alprazolam 0.5 mg tablet	90	30	Fillos, Triantafillos J	Insurance	[facility] #[id number]  2020/01/03	2020/01/04	oxycodone-acetaminophen 5-325 mg tab	90	30	Fillos, Triantafillos J	Insurance	[facility] #[id number]  2019/11/25	2019/11/29	alprazolam 0.5 mg tablet	90	30	Fillos, Triantafillos J	Insurance	[facility] #[id number]  2019/11/25	2019/11/29	zolpidem tartrate 10 mg tablet	30	30	Fillos, Triantafillos J	Insurance	[facility] #[id number]  2019/11/25	2019/11/29	oxycodone-acetaminophen 5-325 mg tab	90	30	Fillos, Triantafillos J	Insurance	[facility] #[id number]  2019/10/08	2019/10/18	zolpidem tartrate 10 mg tablet	30	30	Fillos, Triantafillos J	Medicare	[facility] #[id number]  2019/10/14	2019/10/18	tramadol hcl 50 mg tablet	60	15	Fillos, Triantafillos J	Medicare	[facility] #[id number]  2019/10/08	2019/10/11	alprazolam 0.5 mg tablet	90	30	Fillos, Triantafillos J	Medicare	[facility] #[id number]  2019/03/26	2019/09/16	tramadol hcl 50 mg tablet	60	15	Fillos, Triantafillos J	Medicare	[facility] #[id number]  2019/09/06	2019/09/16	zolpidem tartrate 10 mg tablet	30	30	Fillos, Triantafillos J	Medicare	[facility] #[id number]  2019/09/06	2019/09/10	alprazolam 0.5 mg tablet	90	30	Fillos, Triantafillos J	Medicare	[facility] #[id number]  2019/09/09	2019/09/10	oxycodone-acetaminophen 5-325 mg tab	90	30	Fillos, Triantafillos J	Medicare	[facility] #[id number]  2019/05/29	2019/08/13	tramadol hcl 50 mg tablet	60	15	Fillos, Triantafillos J	Medicare	[facility] #[id number]  2019/08/12	2019/08/13	zolpidem tartrate 10 mg tablet	30	30	Fillos, Triantafillos J	Medicare	[facility] #[id number]  2019/07/02	2019/07/08	oxycodone-acetaminophen 5-325 mg tab	90	30	Fillos, Triantafillos J	Medicare	[facility] #[id number]

## 2020-03-15 NOTE — ED ADULT NURSE REASSESSMENT NOTE - REASSESS COMMUNICATION
pt stating she feels worse with the nebulizers. says the chest feels tighter with the 3rd nebulzer. MD made aware. pt breathnig w. ease on 3LNC. 02 sat 100%. no acute disterss. will continue to monitor. pt also complaining of chronic back pain.

## 2020-03-15 NOTE — ED PROVIDER NOTE - CLINICAL SUMMARY MEDICAL DECISION MAKING FREE TEXT BOX
makenzie pgy2: 69 yo f w/ multiple medical comorbidities, pw acute on chronic dyspnea on exertion. pt witnessed to be unable to walk down ED hallway without fatiguing. sx do not appear to be acs in nature however will obtain trop, ekg. will treat as COPD exacerbation and assess if pt has anemia, reports baseline hg ranges from 8-9 and gets transfusions <7. do not suspect GI loss blood, likely sequelae of MDS. labs, tx, cxr. consider admission if sx do not improve.

## 2020-03-15 NOTE — H&P ADULT - HISTORY OF PRESENT ILLNESS
69 yo female with PMH HTN, HLD, COPD requiring 2L NC at home, MDS (most recent chemo ~ 2 months ago per patient) with recent admission to [facility] from 2/24-3/4 for pneumonia with current chief complaint of dyspnea. Patient states she has been getting dyspneic specifically with exertion. She started noticing the dyspnea about 1 week ago and it has gotten progressively worse since. She has also been having back aches and generalized body aches with occasional foot crampings. 69 yo female with PMH HTN, HLD, COPD requiring 2L NC at home, MDS (most recent chemo ~ 2 months ago per patient) with recent admission to [facility] from 2/24-3/4 for pneumonia with current chief complaint of dyspnea. Patient states she has been getting dyspneic specifically with exertion. She started noticing the dyspnea about 1 week ago and it has gotten progressively worse since. Currently she states that she cannot get up and walk the length of a hospital room to the door without getting short of breath. She has also been having back aches and generalized body aches with occasional foot crampings. She states that when she has this dyspnea with the similar body aches, she usually goes to the hospital and is found to have a low hemoglobin, gets transfused PRBCs, and then improves; she has had this recur several times per year for several years. She denies any recent fever, stating that she regularly checks her temperature at home. She denies any nasal congestion or current cough. She did have a productive cough when hospitalized for pneumonia recently, when she also required BiPAP ventilation per her report, but has not had a cough since being discharged on the 4th. She also notes completion of a full course of antibiotics for the pneumonia she was found to have during that hospitalization. Her throat feels mildly hoarse, which she attributes to the coughing from the recent pneumonia, and it does not feel like a sore throat. Patient denies any SOB at rest and denies orthopnea. She also denies any chest pain, abdominal pain, nausea/vomiting, changes in bowel habits, or recent bleeding. She has not had any urinary problems and denies dysuria. She lives at home with her  and has had no recent travel or been in contact with any sick persons recently. She last received chemo for her myelodysplastic syndrome about 2 months ago, and states that her oncologist told her she had failed that chemotherapy and were going to start a new one, but patient cannot remember the details of the plan. She has had no other recent changes to her medications, and denies any cardiac history such as past MI, CAD, or arrhythmia.

## 2020-03-15 NOTE — DISCHARGE NOTE PROVIDER - NSDCCPCAREPLAN_GEN_ALL_CORE_FT
PRINCIPAL DISCHARGE DIAGNOSIS  Diagnosis: Dyspnea  Assessment and Plan of Treatment: PRINCIPAL DISCHARGE DIAGNOSIS  Diagnosis: Dyspnea  Assessment and Plan of Treatment: You were feeling short of breath on admission and required more oxygen than when you were at home. You were treated with steroids and nebulizers during this admission. You will be discharged with steroids; please continue to take them, as prescribed. If you begin to experience worsening shortness of breath or difficulty breathing, please do not hesitate to seek medical attention. After being discharged, please have close follow up with your PMD, Dr. Melgoza.      SECONDARY DISCHARGE DIAGNOSES  Diagnosis: MDS (myelodysplastic syndrome)  Assessment and Plan of Treatment: You have a history of MDS, which was evident from labs that were drawn while you were admitted. After being discharged, please have close follow up with your hematologist, Dr. Rowland. PRINCIPAL DISCHARGE DIAGNOSIS  Diagnosis: COPD exacerbation  Assessment and Plan of Treatment: You were feeling short of breath on admission and required more oxygen than when you were at home. You were treated with steroids and nebulizers during this admission. You will be discharged with steroids; please continue to take them, as prescribed. If you begin to experience worsening shortness of breath or difficulty breathing, please do not hesitate to seek medical attention. After being discharged, please have close follow up with your PMD, Dr. Melgoza.      SECONDARY DISCHARGE DIAGNOSES  Diagnosis: MDS (myelodysplastic syndrome)  Assessment and Plan of Treatment: You have a history of MDS, which was evident from labs that were drawn while you were admitted. After being discharged, please have close follow up with your hematologist, Dr. Rowland. PRINCIPAL DISCHARGE DIAGNOSIS  Diagnosis: COPD exacerbation  Assessment and Plan of Treatment: You were feeling short of breath on admission and required more oxygen than when you were at home. You were treated with steroids and nebulizers during this admission. You will be discharged with prednisone and azithromycin; please continue to take them, as prescribed. If you begin to experience worsening shortness of breath or difficulty breathing, please do not hesitate to seek medical attention. After being discharged, please have close follow up with your PMD, Dr. Melgoza.      SECONDARY DISCHARGE DIAGNOSES  Diagnosis: MDS (myelodysplastic syndrome)  Assessment and Plan of Treatment: You have a history of MDS, which was evident from labs that were drawn while you were admitted. After being discharged, please have close follow up with your hematologist, Dr. Rowland.

## 2020-03-15 NOTE — ED PROVIDER NOTE - PMH
Arthritis  legs and back  Bronchitis    Gastrointestinal hemorrhage    Hemorrhoids    Hyperlipidemia    Hypertension    Smoker    Systolic murmur  II/VI since she was 10 years old and was told by pediatrician not to do any swimming activity no work up was done however pt worked as a  in hotel doing  housework and pushing cart for 20 years and asymptomatic

## 2020-03-15 NOTE — H&P ADULT - NSHPLABSRESULTS_GEN_ALL_CORE
10.4   10.53 )-----------( 81       ( 15 Mar 2020 06:45 )             33.5     03-15    134<L>  |  97<L>  |  11  ----------------------------<  199<H>  5.1   |  25  |  0.67    Ca    9.6      15 Mar 2020 06:45    TPro  6.4  /  Alb  3.7  /  TBili  1.0  /  DBili  x   /  AST  24  /  ALT  25  /  AlkPhos  85  03-15      Blood Gas Venous Comprehensive (03.15.20 @ 06:45)    Blood Gas Venous - Lactate: 1.8      pH, Venous: 7.35 pH    pCO2, Venous: 58 mmHg    pO2, Venous: 67 mmHg    HCO3, Venous: 29 mmol/L    Blood Gas Venous - FIO2: 27    Base Excess, Venous: 5.7    Oxygen Saturation, Venous: 91.4 %      Radiology:  Xray Chest 2 Views PA/Lat (03.15.20 @ 07:43)    IMPRESSION:   Clear lungs.

## 2020-03-15 NOTE — H&P ADULT - NSHPPHYSICALEXAM_GEN_ALL_CORE
GENERAL: NAD, sitting in bed, appears mildly uncomfortable  HEAD:  Atraumatic, Normocephalic  EYES: EOMI, conjunctiva and sclera clear  ENMT: No tonsillar erythema, exudates, or enlargement; Moist mucous membranes  NECK: Supple, No JVD  CHEST/LUNG: decreased breath sounds bilaterally at bases; no rhonchi, wheezing, or rubs  HEART: Regular rate and rhythm; No murmurs, rubs, or gallops  ABDOMEN: soft, nontender, nondistended. Bowel sounds normoactive; no guarding or rebound  EXTREMITIES:  2+ Peripheral Pulses, No clubbing, cyanosis, or edema  LYMPH: No lymphadenopathy noted  NERVOUS SYSTEM:  A&O x3, no focal deficits appreciated on brief neuro exam, moves all extremities with 5/5 strength  SKIN: No rashes or lesions noted

## 2020-03-15 NOTE — H&P ADULT - PROBLEM SELECTOR PLAN 1
dyspnea specifically on exertion, likely 2/2 COPD exacerbation. Patient concerned that dyspnea is secondary to anemia, but no evidence of bleeding and CBC showing no anemia. Ddx includes viral versus bacterial pneumonia given recent admission and mild leukocytosis, possible treatment failure; however low likelihood of infectious etiology given no additional clinical symptoms such as fever, cough, congestion.  - treat as COPD exacerbation with duonebs q6h  - azithromycin for COPD, 500mg x1, and then 250mg daily for 4 days  - start prednisone 50mg x 4 days  - RVP sent  - if RVP negative and no improvement in days, consider chest CT to eval for pneumonia and consider starting antibacterial coverage for CAP; consider levofloxacin vs ceftriaxone

## 2020-03-16 NOTE — PROGRESS NOTE ADULT - ATTENDING COMMENTS
Patient was seen and examined personally by me. I have discussed the plan and reviewed the resident's note and agree with the above physical exam findings including assessment and plan except as indicated below.    69 y/o F with MDS, recent admission at outside hospital for PNA now presents with increased JUSTICE.     #JUSTICE 2/2 COPD exacerbation: improved, saturating well on RA at rest and ambulation, RVP negative, on home O2 2L PRN and qhs  #MDS - requires multiple transfusions in past. Recently transfused 2 weeks ago. F/u outpt with hematologist  PT recs: home with no needs  Stable for DC home. DC time: 33 min

## 2020-03-16 NOTE — PHYSICAL THERAPY INITIAL EVALUATION ADULT - ADDITIONAL COMMENTS
Patient lives with her  in an apartment, 10 steps to enter. Patient reports she required assistance with ADLs and ambulated with a rolling walker. Pt has home health aide 5 hours/day, 5 days/week. Pt uses home O2, 2L.    Patient was left sitting at the edge of the bed, all lines/tubes intact and call noel within reach, RN Emelyn NARANJO present & aware

## 2020-03-16 NOTE — PHYSICAL THERAPY INITIAL EVALUATION ADULT - PERTINENT HX OF CURRENT PROBLEM, REHAB EVAL
Patient is a 70 year old female admitted to [facility] on 3/15 with current chief complaint of dyspnea. Patient states she has been getting dyspneic specifically with exertion. PMH HTN, HLD, COPD requiring 2L NC at home, MDS (most recent chemo ~ 2 months ago per patient) with recent admission to [facility] from 2/24-3/4 for pneumonia.

## 2020-03-16 NOTE — PHYSICAL THERAPY INITIAL EVALUATION ADULT - PATIENT PROFILE REVIEW, REHAB EVAL
PT orders received: no formal activity order. Consult with RN Emelyn NARANJO, pt may participate in PT evaluation and ambulate with PT./yes

## 2020-03-16 NOTE — DISCHARGE NOTE NURSING/CASE MANAGEMENT/SOCIAL WORK - PATIENT PORTAL LINK FT
You can access the FollowMyHealth Patient Portal offered by [facility] by registering at the following website: [email or web address]. By joining [employer]’s FollowMyHealth portal, you will also be able to view your health information using other applications (apps) compatible with our system.

## 2020-03-16 NOTE — PROGRESS NOTE ADULT - PROBLEM SELECTOR PLAN 5
DVT ppx: SCDs, ambulation as tolerated  Diet: DASH diet  Dispo: uncertain at this time. DVT ppx: SCDs, ambulation as tolerated  Diet: DASH diet  Dispo: pending PT recs

## 2020-03-16 NOTE — PROGRESS NOTE ADULT - ASSESSMENT
69 yo female with PMH of HTN, HLD, COPD requiring 2L NC O2 at home, MDS, and recent admission to [facility] hospital for pneumonia, admitted with new onset dyspnea on exertion concerning for COPD exacerbation.

## 2020-03-16 NOTE — PROGRESS NOTE ADULT - SUBJECTIVE AND OBJECTIVE BOX
Franklin Francis MD  Internal Medicine, PGY-1  Beeper: [phone] ([facility])/ [id number] ([facility])     Subjective:    Patient is a 70y old  Female who presents with a chief complaint of dyspnea on exertion (15 Mar 2020 18:23)    Patient was seen and examined at bedside this AM. No acute overnight events. Denied fever, chills, nausea, vomiting, CP, palpitations, coughing, wheezing, SOB, and abdominal pain.      MEDICATIONS  (STANDING):  albuterol/ipratropium for Nebulization 3 milliLiter(s) Nebulizer every 6 hours  amLODIPine   Tablet 10 milliGRAM(s) Oral daily  atorvastatin 20 milliGRAM(s) Oral at bedtime  azithromycin   Tablet 250 milliGRAM(s) Oral daily  predniSONE   Tablet 50 milliGRAM(s) Oral daily    MEDICATIONS  (PRN):  oxycodone    5 mG/acetaminophen 325 mG 1 Tablet(s) Oral every 8 hours PRN Severe Pain (7 - 10)  zolpidem 5 milliGRAM(s) Oral at bedtime PRN Insomnia      Objective:    Vitals: Vital Signs Last 24 Hrs  T(C): 36.6 (03-16-20 @ 05:57), Max: 37.1 (03-15-20 @ 10:36)  T(F): 97.9 (03-16-20 @ 05:57), Max: 98.7 (03-15-20 @ 10:36)  HR: 99 (03-16-20 @ 05:57) (80 - 99)  BP: 136/66 (03-16-20 @ 05:57) (116/61 - 136/67)  BP(mean): --  RR: 20 (03-16-20 @ 05:57) (18 - 20)  SpO2: 100% (03-16-20 @ 05:57) (96% - 100%)              I&O's Summary      PHYSICAL EXAM:  GENERAL: NAD, well-groomed, well-developed  HEAD:  Atraumatic, Normocephalic  EYES: EOMI, PERRLA, conjunctiva and sclera clear  ENMT: No tonsillar erythema, exudates, or enlargement; Moist mucous membranes, Good dentition, No lesions  NECK: Supple, No JVD, Normal thyroid  CHEST/LUNG: Clear to auscultation bilaterally; No rales, rhonchi, wheezing, or rubs  HEART: Regular rate and rhythm; No murmurs, rubs, or gallops  ABDOMEN: Soft, Nontender, Nondistended; Bowel sounds present  EXTREMITIES:  2+ Peripheral Pulses, No clubbing, cyanosis, or edema  LYMPH: No lymphadenopathy noted  SKIN: No rashes or lesions  NERVOUS SYSTEM:  Alert & Oriented X3, Good concentration; Motor Strength 5/5 B/L upper and lower extremities; DTRs 2+ intact and symmetric                                             LABS:  03-15    134<L>  |  97<L>  |  11  ----------------------------<  199<H>  5.1   |  25  |  0.67    Ca    9.6      15 Mar 2020 06:45    TPro  6.4  /  Alb  3.7  /  TBili  1.0  /  DBili  x   /  AST  24  /  ALT  25  /  AlkPhos  85  03-15                                                    9.3    6.41  )-----------( 81       ( 15 Mar 2020 14:30 )             29.8                         10.4   10.53 )-----------( 81       ( 15 Mar 2020 06:45 )             33.5     CAPILLARY BLOOD GLUCOSE            RECENT CULTURES:      TELEMETRY:    ECG:    TTE:    RADIOLOGY & ADDITIONAL TESTS:    Imaging Personally Reviewed:  [ ] YES  [ ] NO    Consultants involved in case:   Consultant(s) Notes Reviewed:  [ ] YES  [ ] NO:   Care Discussed with Consultants/Other Providers [ ] YES  [ ] NO Franklin Francis MD  Internal Medicine, PGY-1  Beeper: [phone] ([facility])/ [id number] ([facility])     Subjective:    Patient is a 70y old  Female who presents with a chief complaint of dyspnea on exertion (15 Mar 2020 18:23)    Patient was seen and examined at bedside this AM. C/o trouble sleeping due to loud neighbor. Also endorsed anxiety due to trouble breathing, which improved with 0.5mg Xanax. Denied fever, chills, nausea, vomiting, CP, palpitations, coughing, wheezing, SOB, and abdominal pain.      MEDICATIONS  (STANDING):  albuterol/ipratropium for Nebulization 3 milliLiter(s) Nebulizer every 6 hours  amLODIPine   Tablet 10 milliGRAM(s) Oral daily  atorvastatin 20 milliGRAM(s) Oral at bedtime  azithromycin   Tablet 250 milliGRAM(s) Oral daily  predniSONE   Tablet 50 milliGRAM(s) Oral daily    MEDICATIONS  (PRN):  oxycodone    5 mG/acetaminophen 325 mG 1 Tablet(s) Oral every 8 hours PRN Severe Pain (7 - 10)  zolpidem 5 milliGRAM(s) Oral at bedtime PRN Insomnia      Objective:    Vitals: Vital Signs Last 24 Hrs  T(C): 36.6 (03-16-20 @ 05:57), Max: 37.1 (03-15-20 @ 10:36)  T(F): 97.9 (03-16-20 @ 05:57), Max: 98.7 (03-15-20 @ 10:36)  HR: 99 (03-16-20 @ 05:57) (80 - 99)  BP: 136/66 (03-16-20 @ 05:57) (116/61 - 136/67)  BP(mean): --  RR: 20 (03-16-20 @ 05:57) (18 - 20)  SpO2: 100% (03-16-20 @ 05:57) (96% - 100%)              I&O's Summary      PHYSICAL EXAM:  GENERAL: NAD, well-groomed, well-developed  HEAD:  Atraumatic, Normocephalic  EYES: EOMI, PERRLA, conjunctiva and sclera clear  ENMT: No tonsillar erythema, exudates, or enlargement; Moist mucous membranes, Good dentition, No lesions  NECK: Supple, No JVD, Normal thyroid  CHEST/LUNG: Clear to auscultation bilaterally; No rales, rhonchi, wheezing, or rubs  HEART: Regular rate and rhythm; No murmurs, rubs, or gallops  ABDOMEN: Soft, Nontender, Nondistended; Bowel sounds present  EXTREMITIES:  2+ Peripheral Pulses, No clubbing, cyanosis, or edema  LYMPH: No lymphadenopathy noted  SKIN: No rashes or lesions  NERVOUS SYSTEM:  Alert & Oriented X3, Good concentration; Motor Strength 5/5 B/L upper and lower extremities; DTRs 2+ intact and symmetric                                             LABS:  03-15    134<L>  |  97<L>  |  11  ----------------------------<  199<H>  5.1   |  25  |  0.67    Ca    9.6      15 Mar 2020 06:45    TPro  6.4  /  Alb  3.7  /  TBili  1.0  /  DBili  x   /  AST  24  /  ALT  25  /  AlkPhos  85  03-15                                                    9.3    6.41  )-----------( 81       ( 15 Mar 2020 14:30 )             29.8                         10.4   10.53 )-----------( 81       ( 15 Mar 2020 06:45 )             33.5     CAPILLARY BLOOD GLUCOSE            RECENT CULTURES:      TELEMETRY:    ECG:    TTE:    RADIOLOGY & ADDITIONAL TESTS:    Imaging Personally Reviewed:  [ ] YES  [ ] NO    Consultants involved in case:   Consultant(s) Notes Reviewed:  [ ] YES  [ ] NO:   Care Discussed with Consultants/Other Providers [ ] YES  [ ] NO Franklin Francis MD  Internal Medicine, PGY-1  Beeper: [phone] ([facility])/ [id number] ([facility])     Subjective:    Patient is a 70y old  Female who presents with a chief complaint of dyspnea on exertion (15 Mar 2020 18:23)    Patient was seen and examined at bedside this AM. C/o trouble sleeping due to loud neighbor. Also endorsed anxiety due to trouble breathing, which improved with 0.5mg Xanax. States breathing is improved, compared to admission; anxious to go home today. Denied fever, chills, nausea, vomiting, CP, palpitations, coughing, wheezing, SOB, and abdominal pain.      MEDICATIONS  (STANDING):  albuterol/ipratropium for Nebulization 3 milliLiter(s) Nebulizer every 6 hours  amLODIPine   Tablet 10 milliGRAM(s) Oral daily  atorvastatin 20 milliGRAM(s) Oral at bedtime  azithromycin   Tablet 250 milliGRAM(s) Oral daily  predniSONE   Tablet 50 milliGRAM(s) Oral daily    MEDICATIONS  (PRN):  oxycodone    5 mG/acetaminophen 325 mG 1 Tablet(s) Oral every 8 hours PRN Severe Pain (7 - 10)  zolpidem 5 milliGRAM(s) Oral at bedtime PRN Insomnia      Objective:    Vitals: Vital Signs Last 24 Hrs  T(C): 36.6 (03-16-20 @ 05:57), Max: 37.1 (03-15-20 @ 10:36)  T(F): 97.9 (03-16-20 @ 05:57), Max: 98.7 (03-15-20 @ 10:36)  HR: 99 (03-16-20 @ 05:57) (80 - 99)  BP: 136/66 (03-16-20 @ 05:57) (116/61 - 136/67)  RR: 20 (03-16-20 @ 05:57) (18 - 20)  SpO2: 100% (03-16-20 @ 05:57) (96% - 100%)                  PHYSICAL EXAM:  GENERAL: Elderly female resting comfortably in bed in NAD  HEAD:  Atraumatic, Normocephalic  EYES: EOMI, conjunctiva and sclera clear  CHEST/LUNG: expiratory wheezing right anterior chest  HEART: Regular rate and rhythm; No murmurs, rubs, or gallops  ABDOMEN: Soft, Nontender, Nondistended; Bowel sounds present  EXTREMITIES: b/l LE edema  SKIN: No rashes or lesions noted  NERVOUS SYSTEM:  Alert & Oriented X3, Good concentration                                           LABS:  03-16    135  |  96<L>  |  12  ----------------------------<  246<H>  4.2   |  27  |  0.62    Ca    9.0      16 Mar 2020 07:43  Phos  3.7     03-16  Mg     1.7     03-16    TPro  6.4  /  Alb  3.7  /  TBili  1.0  /  DBili  x   /  AST  24  /  ALT  25  /  AlkPhos  85  03-15                          8.8    6.66  )-----------( 62       ( 16 Mar 2020 07:43 )             28.4 Franklin Francis MD  Internal Medicine, PGY-1  Beeper: [phone] ([facility])/ [id number] ([facility])     Subjective:    Patient is a 70y old  Female who presents with a chief complaint of dyspnea on exertion (15 Mar 2020 18:23)    Patient was seen and examined at bedside this AM. C/o trouble sleeping due to loud neighbor. Also endorsed anxiety due to trouble breathing, which improved with 0.5mg Xanax. States breathing is improved, compared to admission; anxious to go home today. Denied fever, chills, nausea, vomiting, CP, palpitations, coughing, wheezing, SOB, and abdominal pain.      MEDICATIONS  (STANDING):  albuterol/ipratropium for Nebulization 3 milliLiter(s) Nebulizer every 6 hours  amLODIPine   Tablet 10 milliGRAM(s) Oral daily  atorvastatin 20 milliGRAM(s) Oral at bedtime  azithromycin   Tablet 250 milliGRAM(s) Oral daily  predniSONE   Tablet 50 milliGRAM(s) Oral daily    MEDICATIONS  (PRN):  oxycodone    5 mG/acetaminophen 325 mG 1 Tablet(s) Oral every 8 hours PRN Severe Pain (7 - 10)  zolpidem 5 milliGRAM(s) Oral at bedtime PRN Insomnia      Objective:    Vitals: Vital Signs Last 24 Hrs  T(C): 36.6 (03-16-20 @ 05:57), Max: 37.1 (03-15-20 @ 10:36)  T(F): 97.9 (03-16-20 @ 05:57), Max: 98.7 (03-15-20 @ 10:36)  HR: 99 (03-16-20 @ 05:57) (80 - 99)  BP: 136/66 (03-16-20 @ 05:57) (116/61 - 136/67)  RR: 20 (03-16-20 @ 05:57) (18 - 20)  SpO2: 100% (03-16-20 @ 05:57) (96% - 100%)                  PHYSICAL EXAM:  GENERAL: Elderly female resting comfortably in bed in NAD  HEAD:  Atraumatic, Normocephalic  EYES: EOMI, conjunctiva and sclera clear  CHEST/LUNG: expiratory wheezing right anterior chest  HEART: Regular rate and rhythm; systolic murmur heard throughout  ABDOMEN: Soft, Nontender, Nondistended; Bowel sounds present  EXTREMITIES: b/l LE edema  SKIN: No rashes or lesions noted  NERVOUS SYSTEM:  Alert & Oriented X3, Good concentration                                           LABS:  03-16    135  |  96<L>  |  12  ----------------------------<  246<H>  4.2   |  27  |  0.62    Ca    9.0      16 Mar 2020 07:43  Phos  3.7     03-16  Mg     1.7     03-16    TPro  6.4  /  Alb  3.7  /  TBili  1.0  /  DBili  x   /  AST  24  /  ALT  25  /  AlkPhos  85  03-15                          8.8    6.66  )-----------( 62       ( 16 Mar 2020 07:43 )             28.4

## 2020-03-16 NOTE — PROGRESS NOTE ADULT - PROBLEM SELECTOR PLAN 1
dyspnea specifically on exertion, likely 2/2 COPD exacerbation. Patient concerned that dyspnea is secondary to anemia, but no evidence of bleeding and CBC showing no anemia. Ddx includes viral versus bacterial pneumonia given recent admission and mild leukocytosis, possible treatment failure; however low likelihood of infectious etiology given no additional clinical symptoms such as fever, cough, congestion.  - treat as COPD exacerbation with duonebs q6h  - azithromycin for COPD, 500mg x1, and then 250mg daily for 4 days  - start prednisone 50mg x 4 days  - RVP sent  - if RVP negative and no improvement in days, consider chest CT to eval for pneumonia and consider starting antibacterial coverage for CAP; consider levofloxacin vs ceftriaxone dyspnea specifically on exertion, likely 2/2 COPD exacerbation. Patient concerned that dyspnea is secondary to anemia, but no evidence of bleeding and CBC showing no anemia. Ddx includes viral versus bacterial pneumonia given recent admission and mild leukocytosis, possible treatment failure; however low likelihood of infectious etiology given no additional clinical symptoms such as fever, cough, congestion.  - SOB improved; adamant about discharge today  - treat as COPD exacerbation with duonebs q6h  - azithromycin for COPD, 500mg x1, and then 250mg daily for 4 days  - c/w prednisone 50mg x 4 days  - RVP negative dyspnea specifically on exertion, likely 2/2 COPD exacerbation. Patient concerned that dyspnea is secondary to anemia, but no evidence of bleeding and CBC showing no anemia. Ddx includes viral versus bacterial pneumonia given recent admission and mild leukocytosis, possible treatment failure; however low likelihood of infectious etiology given no additional clinical symptoms such as fever, cough, congestion.  - SOB improved; adamant about discharge today  - treat as COPD exacerbation with duonebs q6h  - azithromycin for COPD, 500mg x1, and then 250mg daily for 4 days  - c/w prednisone 50mg x 4 days  - RVP negative  - ambulating SpO2 on RA was 95% and ambulating SpO2 on NC was 97%

## 2020-05-31 NOTE — DISCUSSION/SUMMARY
[FreeTextEntry1] : 71 yo female with COPD, amongst other problems, at baseline. She is to continue ICS/LABA/LAMA and nebulized meds as before. Smoking cessation was again stressed.She is to follow up with her PMD and hematologist as before.Her  was present.

## 2020-05-31 NOTE — REVIEW OF SYSTEMS
[Fatigue] : fatigue [Cough] : cough [Hemoptysis] : no hemoptysis [Sputum] : sputum [Wheezing] : no wheezing [SOB on Exertion] : sob on exertion [Edema] : edema [Anemia] : anemia [Negative] : Endocrine

## 2020-05-31 NOTE — PHYSICAL EXAM
[No Acute Distress] : no acute distress [Normal Oropharynx] : normal oropharynx [Normal Appearance] : normal appearance [No Neck Mass] : no neck mass [Normal Rate/Rhythm] : normal rate/rhythm [Normal S1, S2] : normal s1, s2 [No Murmurs] : no murmurs [No Resp Distress] : no resp distress [Clear to Auscultation Bilaterally] : clear to auscultation bilaterally [No Abnormalities] : no abnormalities [Benign] : benign [Normal Gait] : normal gait [No Clubbing] : no clubbing [No Cyanosis] : no cyanosis [FROM] : FROM [1+ Pitting] : 1+ pitting [Normal Color/ Pigmentation] : normal color/ pigmentation [No Focal Deficits] : no focal deficits [Oriented x3] : oriented x3 [Normal Affect] : normal affect [TextBox_2] : Ill appearing [TextBox_68] : Decreased breath sounds bilaterally

## 2020-05-31 NOTE — HISTORY OF PRESENT ILLNESS
[TextBox_4] : 69 yo female with hx of COPD presents for follow up. The patient feels "weak" with PRN productive cough and leg edema. She denies fever, chest pain or hemoptysis.She uses trelegy daily with recent increase in albuterol neb use and nocturnal oxygen.She had two units of packed RBC transfused last week. She continues to smoke but "much less".

## 2020-09-06 PROBLEM — J44.9 COPD, SEVERE: Status: ACTIVE | Noted: 2018-05-08

## 2020-09-06 PROBLEM — Z72.0 TOBACCO USE: Status: ACTIVE | Noted: 2018-05-08

## 2020-09-06 PROBLEM — R06.00 DOE (DYSPNEA ON EXERTION): Status: ACTIVE | Noted: 2018-05-08

## 2020-09-06 NOTE — DISCUSSION/SUMMARY
[FreeTextEntry1] : 69 yo female with OAD amongst other multiple problems, at baseline on nebulized albuterol and ICS/LABA/LAMA. Smoking cessation was again stressed. She is to follow up with hematology and her PMD as before.\par Her  was present.

## 2020-09-06 NOTE — HISTORY OF PRESENT ILLNESS
[>= 30 pack years] : >= 30 pack years [Current] : current [TextBox_13] : 50 [TextBox_4] : 69 yo female with hx of COPD presents for follow up. The patient is "OK" on trelegy daily with PRN nebulized albuterol and supplemental oxygen. She complains of JUSTICE without cough or chest pain.She continues to smoking but "much less". The patient has refractory anemia, with frequent transfusions, last three weeks ago. [TextBox_11] : 1

## 2020-09-06 NOTE — PHYSICAL EXAM
[No Acute Distress] : no acute distress [Normal Oropharynx] : normal oropharynx [Normal Rate/Rhythm] : normal rate/rhythm [No Neck Mass] : no neck mass [Normal Appearance] : normal appearance [Normal S1, S2] : normal s1, s2 [Murmur ___ / 6] : murmur [unfilled] / 6 [No Resp Distress] : no resp distress [No Abnormalities] : no abnormalities [Rales] : rales [Benign] : benign [No Clubbing] : no clubbing [Normal Gait] : normal gait [FROM] : FROM [1+ Pitting] : 1+ pitting [No Cyanosis] : no cyanosis [Normal Color/ Pigmentation] : normal color/ pigmentation [No Focal Deficits] : no focal deficits [Normal Affect] : normal affect [TextBox_68] : Decreased breath sounds bilaterally [Oriented x3] : oriented x3 [TextBox_2] : Ill appearing

## 2020-09-06 NOTE — REVIEW OF SYSTEMS
[Hemoptysis] : no hemoptysis [Cough] : no cough [Fatigue] : fatigue [Sputum] : no sputum [Wheezing] : no wheezing [Edema] : edema [Anemia] : anemia [SOB on Exertion] : sob on exertion [Negative] : Endocrine

## 2023-08-12 NOTE — H&P ADULT - NSHPREVIEWOFSYSTEMS_GEN_ALL_CORE
CONSTITUTIONAL: No fever, weight loss, or fatigue  HEENT: No difficulty hearing, tinnitus, or vertigo; No sinus or throat pain  NECK: No pain or stiffness  RESPIRATORY: + dyspnea on exertion; No cough, wheezing, chills or hemoptysis  CARDIOVASCULAR: No orthopnea; No chest pain, palpitations, dizziness, or leg swelling  GASTROINTESTINAL: No abdominal or epigastric pain. No nausea, vomiting, or hematemesis; No diarrhea or constipation.  GENITOURINARY: No dysuria, frequency, hematuria, or incontinence  NEUROLOGICAL: No headaches, memory loss, loss of strength, numbness, or tremors  SKIN: No itching, burning, rashes, or lesions   MUSCULOSKELETAL: No joint pain or swelling; No muscle, back, or extremity pain
Refused

## 2024-03-06 NOTE — DISCHARGE NOTE PROVIDER - CARE PROVIDER_API CALL
----- Message from Lula Riggisn PA-C sent at 3/5/2024  4:04 PM CST -----  Potassium in normal range. Not cause of leg cramps.    Kylee Rowland)  Hematology; Medical Oncology  [address]  [place] [address]  Phone: [phone]  Fax: [phone]  Follow Up Time: 2 weeks    Roxi Melgoza  Phone: [phone]  Fax: (   )    -  Follow Up Time: 1 week